# Patient Record
Sex: FEMALE | Race: WHITE | Employment: FULL TIME | URBAN - METROPOLITAN AREA
[De-identification: names, ages, dates, MRNs, and addresses within clinical notes are randomized per-mention and may not be internally consistent; named-entity substitution may affect disease eponyms.]

---

## 2023-11-09 ENCOUNTER — OFFICE VISIT (OUTPATIENT)
Dept: ORTHOPEDIC SURGERY | Age: 53
End: 2023-11-09

## 2023-11-09 VITALS — BODY MASS INDEX: 32.44 KG/M2 | WEIGHT: 190 LBS | HEIGHT: 64 IN

## 2023-11-09 DIAGNOSIS — M79.671 PAIN OF RIGHT HEEL: Primary | ICD-10-CM

## 2023-11-09 DIAGNOSIS — M19.011 ARTHRITIS OF RIGHT ACROMIOCLAVICULAR JOINT: ICD-10-CM

## 2023-11-09 DIAGNOSIS — M76.61 ACHILLES BURSITIS OF RIGHT LOWER EXTREMITY: ICD-10-CM

## 2023-11-09 DIAGNOSIS — M25.511 RIGHT SHOULDER PAIN, UNSPECIFIED CHRONICITY: ICD-10-CM

## 2023-11-09 RX ORDER — MELOXICAM 15 MG/1
TABLET ORAL
Qty: 30 TABLET | Refills: 2 | Status: SHIPPED | OUTPATIENT
Start: 2023-11-09

## 2023-11-20 ENCOUNTER — OFFICE VISIT (OUTPATIENT)
Dept: ORTHOPEDIC SURGERY | Age: 53
End: 2023-11-20
Payer: COMMERCIAL

## 2023-11-20 DIAGNOSIS — M25.552 LEFT HIP PAIN: Primary | ICD-10-CM

## 2023-11-20 DIAGNOSIS — M70.62 TROCHANTERIC BURSITIS OF LEFT HIP: ICD-10-CM

## 2023-11-20 PROCEDURE — 99204 OFFICE O/P NEW MOD 45 MIN: CPT | Performed by: PHYSICIAN ASSISTANT

## 2023-11-20 NOTE — PROGRESS NOTES
Name: Janet Yun  YOB: 1970  Gender: female  MRN: [id number]    CC:   Chief Complaint   Patient presents with    Hip Pain     Left hip          HPI: Janet Yun is a 48 y.o. female with PMHx of hip dysplasia as a child here for evaluation of left hip pain  There was not an acute injury  Regarding the hip pain, it has been present for years and is becoming worse recently, especially after being placed in a ambulation boot on the right foot for achilles problems treated by Dr. Rey Leigh. The pain is primarily lateral, over greater trochanter  she describes the pain as a constant ache that is intermittently sharp with activity  The pain is worse with inactivity, rising after sitting, at night, often waking them from sleep, with the first few steps of walking and then improves, and lying on the left side  The pain does not radiate down the leg. Treatment so far has been activity modification, Tylenol, ibuprofen, and meloxicam with varying relief. Review of Systems  As per HPI. Pertinent positives and negatives are addressed with the patient, particularly those related to musculoskeletal concerns. Non-orthopaedic concerns were referred back to the primary care physician. No Known Allergies                 PHYSICAL EXAMINATION:   The patient is alert and oriented, in no distress. There were no vitals filed for this visit. The cervical, thoracic and lumbar spine are without compromising deformity. The upper extremities demonstrate reasonable tone, strength and function bilaterally. The lower extremities are as described below. Circulation is normal with palpable pedal pulses bilaterally and no edema. Skin of the leg is normal with no chronic stasis disease bilaterally. Sensation is intact to light touch bilaterally. The gait is noted to be with a slight trendelenburg and antalgia  There is no tenderness to palpation along the spinous processes and paraspinal musculature.    There mild

## 2023-12-04 ENCOUNTER — OFFICE VISIT (OUTPATIENT)
Dept: ORTHOPEDIC SURGERY | Age: 53
End: 2023-12-04
Payer: COMMERCIAL

## 2023-12-04 DIAGNOSIS — M25.511 RIGHT SHOULDER PAIN, UNSPECIFIED CHRONICITY: ICD-10-CM

## 2023-12-04 DIAGNOSIS — M76.61 ACHILLES BURSITIS OF RIGHT LOWER EXTREMITY: Primary | ICD-10-CM

## 2023-12-04 DIAGNOSIS — M19.011 ARTHRITIS OF RIGHT ACROMIOCLAVICULAR JOINT: ICD-10-CM

## 2023-12-04 PROCEDURE — 99213 OFFICE O/P EST LOW 20 MIN: CPT | Performed by: ORTHOPAEDIC SURGERY

## 2023-12-04 NOTE — PROGRESS NOTES
Name: Martin Maya  YOB: 1970  Gender: female  MRN: [id number]    12/04/2023: Here to discuss her right Achilles    HPI:   11/09/2023: Initial visit: 2 concerns/conditions  1. Right shoulder pain over 2 years duration: Certain activity irritates her shoulder: No trauma  2. Right posterior Achilles pain: 3-month duration: No trauma    ROS/Meds/PSH/PMH/FH/SH: reviewed today    Tobacco:  reports that she has quit smoking. Her smoking use included cigarettes. She has never used smokeless tobacco.     Physical Examination:  Patient appears to be alert and oriented with acceptable appearance. No obvious distress or SOB  CV: appears to have acceptable vascular color and capillary refill  Neuro: appears to have mostly intact light touch sensation   Skin: Right = persistent noninsertional Achilles elliptical thickening  MS: Standing: Plantigrade: Gait protected   Right Achilles = noninsertional Achilles tendon elliptical thickening pain; no Achilles deficiency; 5/5 strength; no instability    XR: Right: Standing AP lateral mortise ankle taken 11/09/2023, with no significant posterior heel enthesopathy; Denise's; moderate tarsometatarsal arthritis; mild subtalar arthritis; compared to prior foot films with similar findings  XR Impression:  As above      XR: Right shoulder: AP Grashey axillary view taken 11/09/2023 with acromioclavicular arthritis; no soft tissue calcification or significant glenohumeral changes  XR Impression:  As above       Injection: Discussed as an option    Assessment:    Right noninsertional Achilles tendinosis  Right 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 We explored treatment options.      Regarding her shoulder pain:   I suspect she has chronic subacromial impingement [facility]  We discussed injection and physical therapy, but decided to consult shoulder partner    Regarding

## 2023-12-15 ENCOUNTER — OFFICE VISIT (OUTPATIENT)
Dept: ORTHOPEDIC SURGERY | Age: 53
End: 2023-12-15

## 2023-12-15 DIAGNOSIS — M76.61 ACHILLES BURSITIS OF RIGHT LOWER EXTREMITY: ICD-10-CM

## 2023-12-15 DIAGNOSIS — M79.671 PAIN OF RIGHT HEEL: Primary | ICD-10-CM

## 2023-12-15 NOTE — PROGRESS NOTES
fluid in the pre-Achilles bursa with bursitis. 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 We explored treatment options. We discussed her MRI scan findings  She has no PTT pain or concerns   MRI with non-mucoid related noninsertional Achilles tendinosis/tendinitis/peritendinitis  We discussed surgical treatment      Plan is continue conservative care with boots with heel lift, PT and trial of oral Prednisone  PT: Prescribed at [facility] [place] PT: [place]: Please consider, per your expertise, modalities such as iontophoresis, phonophoresis and dry needling    Medication - OTC meds prn: Prednisone -last visit we discussed irritable response with oral steroids, but she is willing to try  Prescribed: Prednisone 5 mg Sterapred pack: #48: Take per package insert: We discussed if she has any irritable response how to safely wean Prednisone. She understands she cannot abruptly stop it    Surgical discussion: She understands that due to my upper extremity conditions, the need to see a surgical partner for surgery. I outlined surgery with certain risks/complications and expected post-op course. My opined surgical recommendation and surgical discussion may not align exactly with my surgical partner's opinion; therefore, my partner's recommendation is what should be accepted and followed. Surgery: Right Achilles reconstruction: +/- [facility] transfer: Discussed potential great toe weakness    Follow up: As discussed   Work status: Due to her shoulder and Achilles concerns: Please limit pulling pushing and lifting to 15 pounds: No squatting or climbing    History and discussion of management: Her     This note was created using Dragon voice recognition software which may result in errors of speech and spelling recognition and word/phrase syntax errors.

## 2023-12-17 RX ORDER — PREDNISONE 5 MG/1
TABLET ORAL
Qty: 1 EACH | Refills: 2 | Status: SHIPPED | OUTPATIENT
Start: 2023-12-17

## 2024-01-09 ENCOUNTER — OFFICE VISIT (OUTPATIENT)
Dept: ORTHOPEDIC SURGERY | Age: 54
End: 2024-01-09

## 2024-01-09 DIAGNOSIS — M19.011 DEGENERATIVE JOINT DISEASE OF RIGHT ACROMIOCLAVICULAR JOINT: ICD-10-CM

## 2024-01-09 DIAGNOSIS — M25.511 RIGHT SHOULDER PAIN, UNSPECIFIED CHRONICITY: Primary | ICD-10-CM

## 2024-01-09 NOTE — PROGRESS NOTES
Name: Ruby Sanders  YOB: 1970  Gender: female  MRN: [id number]      What: Right shoulder pain  How: Insidious onset  When: Several years duration    Referring provider: Dr. Colon    HPI: Ruby Sanders is a 53 y.o. right-hand-dominant female seen at the request of Dr. Colon for right shoulder problems.  She has a known history of cervical spondylosis.  She has had thyroid issues in the past.  She works at [employer].  She denies any right shoulder injury.  She complains of intermittent right shoulder pain.  She complains of paresthesias down the right arm.  She complains of headaches.  She is being concurrently treated by Dr. Colon for an Achilles tendon issue.  She denies any left shoulder problems.      ROS/Meds/PSH/PMH/FH/SH: A ten system review of systems was performed and is negative other than what is in the HPI.   Tobacco:  reports that she has quit smoking. Her smoking use included cigarettes. She has never used smokeless tobacco.  There were no vitals taken for this visit.     Physical Examination:  She is an awake alert pleasant female ambulating without difficulty  She has a slight restriction in cervical spine range of motion without radicular findings    The left shoulder has 0 to 180 degrees of active and 0 to 180 degrees passive forward elevation.   Internal rotation is to T6.  External rotation is to 60 degrees at the side.   In the 90 degree abducted position 90 degrees of external and 90 degrees internal rotation  The AC joint is non-tender  SC joint is non-tender.   Greater tuberosity is non-tender.  negative biceps  Negative O'Briens sign  negative lift-off sign  Negative belly press sign  Negative bear huggers sign  negative drop sign  negative hornblower's sign  No posterior glenohumeral joint line tenderness.   No evident excessive external rotation  Rotator cuff strength is 5/5.  negative external rotation stress test.   Negative empty can sign  There is no evident

## 2024-02-20 ENCOUNTER — OFFICE VISIT (OUTPATIENT)
Dept: ORTHOPEDIC SURGERY | Age: 54
End: 2024-02-20
Payer: COMMERCIAL

## 2024-02-20 DIAGNOSIS — M25.511 RIGHT SHOULDER PAIN, UNSPECIFIED CHRONICITY: Primary | ICD-10-CM

## 2024-02-20 DIAGNOSIS — M19.011 DEGENERATIVE JOINT DISEASE OF RIGHT ACROMIOCLAVICULAR JOINT: ICD-10-CM

## 2024-02-20 PROCEDURE — 99212 OFFICE O/P EST SF 10 MIN: CPT | Performed by: ORTHOPAEDIC SURGERY

## 2024-02-20 NOTE — PROGRESS NOTES
Name: Ruby Sanders  YOB: 1970  Gender: female  MRN: [id number]          HPI: Ruby Sanders is a 53 y.o. right-hand-dominant female seen at the request of Dr. Colon for right shoulder problems.  She has a known history of cervical spondylosis.  She has had thyroid issues in the past.  She works at [employer].  She denies any right shoulder injury.  She complains of intermittent right shoulder pain.  She complains of paresthesias down the right arm.  She complains of headaches.  She is being concurrently treated by Dr. Colon for an Achilles tendon issue.  She denies any left shoulder problems.  I injected her right shoulder on the last visit.  She returns and notes that she is completely better.      ROS/Meds/PSH/PMH/FH/SH: A ten system review of systems was performed and is negative other than what is in the HPI.   Tobacco:  reports that she has quit smoking. Her smoking use included cigarettes. She has never used smokeless tobacco.  There were no vitals taken for this visit.     Physical Examination:  She is an awake alert pleasant female ambulating without difficulty  She has a slight restriction in cervical spine range of motion without radicular findings    The left shoulder has 0 to 180 degrees of active and 0 to 180 degrees passive forward elevation.   Internal rotation is to T6.  External rotation is to 60 degrees at the side.   In the 90 degree abducted position 90 degrees of external and 90 degrees internal rotation  The AC joint is non-tender  SC joint is non-tender.   Greater tuberosity is non-tender.  negative biceps  Negative O'Briens sign  negative lift-off sign  Negative belly press sign  Negative bear huggers sign  negative drop sign  negative hornblower's sign  No posterior glenohumeral joint line tenderness.   No evident excessive external rotation  Rotator cuff strength is 5/5.  negative external rotation stress test.   Negative empty can sign  There is no evident anterior or